# Patient Record
Sex: FEMALE | Employment: UNEMPLOYED | URBAN - METROPOLITAN AREA
[De-identification: names, ages, dates, MRNs, and addresses within clinical notes are randomized per-mention and may not be internally consistent; named-entity substitution may affect disease eponyms.]

---

## 2024-04-15 ENCOUNTER — HOSPITAL ENCOUNTER (EMERGENCY)
Facility: HOSPITAL | Age: 64
Discharge: HOME | End: 2024-04-15
Attending: EMERGENCY MEDICINE

## 2024-04-15 ENCOUNTER — APPOINTMENT (EMERGENCY)
Dept: RADIOLOGY | Facility: HOSPITAL | Age: 64
End: 2024-04-15

## 2024-04-15 VITALS
RESPIRATION RATE: 16 BRPM | TEMPERATURE: 97.6 F | WEIGHT: 152.4 LBS | SYSTOLIC BLOOD PRESSURE: 156 MMHG | OXYGEN SATURATION: 95 % | DIASTOLIC BLOOD PRESSURE: 84 MMHG | HEART RATE: 93 BPM

## 2024-04-15 DIAGNOSIS — M25.511 RIGHT SHOULDER PAIN: Primary | ICD-10-CM

## 2024-04-15 PROCEDURE — 99283 EMERGENCY DEPT VISIT LOW MDM: CPT

## 2024-04-15 PROCEDURE — 99284 EMERGENCY DEPT VISIT MOD MDM: CPT | Performed by: EMERGENCY MEDICINE

## 2024-04-15 PROCEDURE — 73030 X-RAY EXAM OF SHOULDER: CPT

## 2024-04-15 RX ORDER — METHOCARBAMOL 500 MG/1
500 TABLET, FILM COATED ORAL ONCE
Status: COMPLETED | OUTPATIENT
Start: 2024-04-15 | End: 2024-04-15

## 2024-04-15 RX ORDER — DEXAMETHASONE 4 MG/1
4 TABLET ORAL ONCE
Status: COMPLETED | OUTPATIENT
Start: 2024-04-15 | End: 2024-04-15

## 2024-04-15 RX ORDER — METHYLPREDNISOLONE 4 MG/1
TABLET ORAL
Qty: 21 TABLET | Refills: 0 | Status: SHIPPED | OUTPATIENT
Start: 2024-04-15

## 2024-04-15 RX ADMIN — DEXAMETHASONE 4 MG: 4 TABLET ORAL at 08:37

## 2024-04-15 RX ADMIN — METHOCARBAMOL TABLETS 500 MG: 500 TABLET, COATED ORAL at 08:37

## 2024-04-15 NOTE — Clinical Note
Michaela MiguelJuan Franciscoalo was seen and treated in our emergency department on 4/15/2024.                Diagnosis:     Michaela  .    She may return on this date: 04/17/2024         If you have any questions or concerns, please don't hesitate to call.      John Eastman MD    ______________________________           _______________          _______________  Hospital Representative                              Date                                Time

## 2024-04-15 NOTE — ED PROVIDER NOTES
History  Chief Complaint   Patient presents with    Shoulder Injury     Right shoulder pain. Took tylenol at 6am. Was moving a 49lb suitcase..      Is right-hand dominant she states she lifted luggage on 6 April and her right shoulder is been hurting ever since she is allergic to aspirin and NSAIDs denies any weakness numbness extremity no other problems or concerns does not see anybody about this yet      Shoulder Injury  Associated symptoms: no fever and no neck pain        None       Past Medical History:   Diagnosis Date    Asthma     Migraine        History reviewed. No pertinent surgical history.    History reviewed. No pertinent family history.  I have reviewed and agree with the history as documented.    E-Cigarette/Vaping     E-Cigarette/Vaping Substances     Social History     Tobacco Use    Smoking status: Never    Smokeless tobacco: Never   Substance Use Topics    Alcohol use: Never    Drug use: Never       Review of Systems   Constitutional:  Negative for fever.   Respiratory:  Negative for shortness of breath.    Cardiovascular:  Negative for chest pain.   Gastrointestinal:  Negative for abdominal pain.   Musculoskeletal:  Positive for joint swelling. Negative for neck pain.   Skin:  Negative for color change and rash.   Neurological:  Negative for weakness and numbness.   Psychiatric/Behavioral:  Negative for confusion.    All other systems reviewed and are negative.      Physical Exam  Physical Exam  Vitals and nursing note reviewed.   Constitutional:       General: She is not in acute distress.     Appearance: She is well-developed.   HENT:      Head: Normocephalic and atraumatic.   Eyes:      Conjunctiva/sclera: Conjunctivae normal.   Cardiovascular:      Rate and Rhythm: Normal rate and regular rhythm.      Heart sounds: No murmur heard.  Pulmonary:      Effort: Pulmonary effort is normal. No respiratory distress.      Breath sounds: Normal breath sounds.   Abdominal:      Palpations: Abdomen is  soft.      Tenderness: There is no abdominal tenderness.   Musculoskeletal:         General: No swelling.      Cervical back: Neck supple.      Comments: Right shoulder nontender to palp patient however any movement of abduction and external rotation causes discomfort patient unable to abduct past 90 degrees motor and sensory intact   Skin:     General: Skin is warm and dry.      Capillary Refill: Capillary refill takes less than 2 seconds.   Neurological:      General: No focal deficit present.      Mental Status: She is alert.      Sensory: No sensory deficit.      Motor: No weakness.   Psychiatric:         Mood and Affect: Mood normal.         Vital Signs  ED Triage Vitals [04/15/24 0804]   Temperature Pulse Respirations Blood Pressure SpO2   97.6 °F (36.4 °C) 93 16 156/84 95 %      Temp Source Heart Rate Source Patient Position - Orthostatic VS BP Location FiO2 (%)   Tympanic Monitor Sitting Left arm --      Pain Score       --           Vitals:    04/15/24 0804   BP: 156/84   Pulse: 93   Patient Position - Orthostatic VS: Sitting         Visual Acuity      ED Medications  Medications   methocarbamol (ROBAXIN) tablet 500 mg (has no administration in time range)   dexamethasone (DECADRON) tablet 4 mg (has no administration in time range)       Diagnostic Studies  Results Reviewed       None                   XR shoulder 2+ views RIGHT   ED Interpretation by John Eastman MD (04/15 0826)   Possible calcific tendonitis                 Procedures  Procedures         ED Course                                             Medical Decision Making  Tray done to rule out fracture dislocation appears to show calcific tendinitis versus an old chip fracture patient placed in a sling for comfort put on steroid Dosepak follow-up with orthopedic neurovascular intact    Amount and/or Complexity of Data Reviewed  Radiology: ordered and independent interpretation performed.    Risk  Prescription drug management.              Disposition  Final diagnoses:   Right shoulder pain     Time reflects when diagnosis was documented in both MDM as applicable and the Disposition within this note       Time User Action Codes Description Comment    4/15/2024  8:28 AM John Eastman [M25.511] Right shoulder pain           ED Disposition       ED Disposition   Discharge    Condition   Stable    Date/Time   Mon Apr 15, 2024  8:28 AM    Comment   Michaela Anahi discharge to home/self care.                   Follow-up Information       Follow up With Specialties Details Why Contact Info Additional Information    [facility] [place] [facility] In 3 days  [address]  [address]  [facility] [address] [phone] [facility] [place], [address] [place] [address], [address], [place], [place], [address] [phone]            Patient's Medications   Discharge Prescriptions    METHYLPREDNISOLONE 4 MG TABLET THERAPY PACK    Use as directed on package       Start Date: 4/15/2024 End Date: --       Order Dose: --       Quantity: 21 tablet    Refills: 0       No discharge procedures on file.    PDMP Review       None            ED Provider  Electronically Signed by             John Eastman MD  04/15/24 0829